# Patient Record
Sex: MALE | Race: WHITE | NOT HISPANIC OR LATINO | Employment: FULL TIME | URBAN - METROPOLITAN AREA
[De-identification: names, ages, dates, MRNs, and addresses within clinical notes are randomized per-mention and may not be internally consistent; named-entity substitution may affect disease eponyms.]

---

## 2022-07-09 ENCOUNTER — HOSPITAL ENCOUNTER (EMERGENCY)
Facility: CLINIC | Age: 32
Discharge: HOME | End: 2022-07-09
Attending: EMERGENCY MEDICINE | Admitting: EMERGENCY MEDICINE

## 2022-07-09 VITALS
SYSTOLIC BLOOD PRESSURE: 138 MMHG | TEMPERATURE: 98.4 F | RESPIRATION RATE: 20 BRPM | DIASTOLIC BLOOD PRESSURE: 95 MMHG | OXYGEN SATURATION: 97 % | HEART RATE: 78 BPM

## 2022-07-09 DIAGNOSIS — L04.9 LYMPH NODE ABSCESS: ICD-10-CM

## 2022-07-09 PROCEDURE — 99283 EMERGENCY DEPT VISIT LOW MDM: CPT

## 2022-07-09 RX ORDER — CLINDAMYCIN HCL 300 MG
300 CAPSULE ORAL 4 TIMES DAILY
Qty: 28 CAPSULE | Refills: 0 | Status: SHIPPED | OUTPATIENT
Start: 2022-07-09 | End: 2022-07-16

## 2022-07-09 ASSESSMENT — ENCOUNTER SYMPTOMS
FEVER: 0
UNEXPECTED WEIGHT CHANGE: 0

## 2022-07-09 NOTE — ED PROVIDER NOTES
"  History     Chief Complaint:  Mass    The history is provided by the patient.      Dharmesh Munoz is a 31 year old male who presents with mass on his right clavicle. On Monday afternoon he woke up with a small white dot on his right clavicle. Since then it has grown in size and is now affecting his range of motion and sleep. He denies a fever or unexpected weight loss. He has night sweats but says this is normal for him since he sleeps during the day. 1 week ago he was feeling \"under the weather\" but has felt better since. A little while ago he had a pilonidal cyst on his buttock.     Review of Systems   Constitutional: Negative for fever and unexpected weight change.   All other systems reviewed and are negative.    Allergies:  Penicillins    Medications:  He has no current prescribed medications.     Past Medical History:     Pilonidal cyst     Social History:  He reports to the ED alone.   He works overnights.     Physical Exam     Patient Vitals for the past 24 hrs:   BP Temp Pulse Resp SpO2   07/09/22 0007 138/95 98.4  F (36.9  C) 78 20 97 %     Physical Exam  Nursing note and vitals reviewed.  Constitutional: Cooperative.   HENT:    Full ROM of neck.   Cardiovascular: Normal rate, regular rhythm and normal heart sounds.  No murmur.  Pulmonary/Chest: Effort normal and breath sounds normal. No respiratory distress.   Neurological: Alert. Oriented x4  Skin: Skin is warm and dry. Mobile 1 cm rubber mass erythematous and tender and base of right neck superior to clavicle. No surrounding erythema or crepitus.   Psychiatric: Normal mood and affect.     Emergency Department Course     Reviewed:  I reviewed nursing notes, vitals, past medical history and Care Everywhere    Assessments:  0152 I obtained history and examined the patient as noted above.     Disposition:  The patient was discharged to home.     Impression & Plan     Medical Decision Making:  Dharmesh Munoz is a 31 year old male who presents with an " inflamed rubbery mass on his right neck near his clavicle. This is most consistent with an infected lymph node. Certainly considered infected cutaneous cyst or abscess. I think this is less likely based on bedside appearance and ultrasound. I would not perform incision and drainage or aspiration at this time. We will start with oral antibiotics. He knows that if this is worsening or develops fevers he needs to be re-seen. I think this is less likely to represent neoplastic disease from an underlying cancer given his clinical appearance. He is comfortable with this discussion and is discharged home.     Diagnosis:    ICD-10-CM    1. Lymph node abscess - right neck  L04.9        Discharge Medications:  Discharge Medication List as of 7/9/2022  2:09 AM      START taking these medications    Details   clindamycin (CLEOCIN) 300 MG capsule Take 1 capsule (300 mg) by mouth 4 times daily for 7 days, Disp-28 capsule, R-0, Local Print             Scribe Disclosure:  I, PATRICIO PHILLIPS, am serving as a scribe at 1:52 AM on 7/9/2022 to document services personally performed by Michael Lux MD based on my observations and the provider's statements to me.            Michael Lux MD  07/09/22 1901

## 2022-07-09 NOTE — ED TRIAGE NOTES
Pt states area of redness and swelling near right clavicle for 2 days pta. Pt states area has been increasing in size and is also tender. ABCs intact GCS 15     Triage Assessment     Row Name 07/09/22 0007       Triage Assessment (Adult)    Airway WDL WDL       Respiratory WDL    Respiratory WDL WDL       Skin Circulation/Temperature WDL    Skin Circulation/Temperature WDL WDL       Cardiac WDL    Cardiac WDL WDL       Peripheral/Neurovascular WDL    Peripheral Neurovascular WDL WDL       Cognitive/Neuro/Behavioral WDL    Cognitive/Neuro/Behavioral WDL WDL